# Patient Record
Sex: FEMALE | Race: OTHER | NOT HISPANIC OR LATINO
[De-identification: names, ages, dates, MRNs, and addresses within clinical notes are randomized per-mention and may not be internally consistent; named-entity substitution may affect disease eponyms.]

---

## 2022-06-01 ENCOUNTER — TRANSCRIPTION ENCOUNTER (OUTPATIENT)
Age: 27
End: 2022-06-01

## 2022-06-01 NOTE — ASU PATIENT PROFILE, ADULT - ABILITY TO HEAR (WITH HEARING AID OR HEARING APPLIANCE IF NORMALLY USED):
left ear/Mildly to Moderately Impaired: difficulty hearing in some environments or speaker may need to increase volume or speak distinctly Adequate: hears normal conversation without difficulty

## 2022-06-01 NOTE — ASU PATIENT PROFILE, ADULT - NS PREOP UNDERSTANDS INFO
Patient instructed to be npo after midnight, wear loose comfortable clothes, comfortable shoes, patient instructed to bring covid id card, insurance card, picture id and a credit card for any financial transactions. Patient verbalizes understanding of preop instructions, denies any questions. Time given/yes

## 2022-06-01 NOTE — ASU PATIENT PROFILE, ADULT - NS TRANSFER PATIENT BELONGINGS
earrings, necklace, bracelet; belly piercing/Cell Phone/PDA (specify)/Jewelry/Money (specify)/Clothing

## 2022-06-01 NOTE — ASU PATIENT PROFILE, ADULT - FALL HARM RISK - UNIVERSAL INTERVENTIONS
Bed in lowest position, wheels locked, appropriate side rails in place/Call bell, personal items and telephone in reach/Instruct patient to call for assistance before getting out of bed or chair/Non-slip footwear when patient is out of bed/[place] to call system/Physically safe environment - no spills, clutter or unnecessary equipment/Purposeful Proactive Rounding/Room/bathroom lighting operational, light cord in reach

## 2022-06-01 NOTE — ASU PATIENT PROFILE, ADULT - INTERNATIONAL TRAVEL
-- DO NOT REPLY / DO NOT REPLY ALL --  -- Message is from the [facility]--    Patient is requesting a medication refill - medication is on active medication list    Patient is currently OUT of the requested medication.    Was Medication Pended?  No.     Rx Name and Dose:  Concerna ER 18 mg tablet     Duration: 30 days With refills for 90 day    Pharmacy  [id number] - [facility] [address]    Patient confirmed the above pharmacy as correct?  Yes    Does this request need an existing or new prescription at a pharmacy to be sent to a new pharmacy location?   No    Caller Information       Type Contact Phone    12/30/2021 10:40 AM CST Phone (Incoming) MONICA DIOP (Father) [phone] (M)          Alternative phone number: none    Turnaround time given to caller:   \"This message will be sent to [state Provider's name]. The clinical team will fulfill your request as soon as they review your message.\"   No

## 2022-06-02 ENCOUNTER — OUTPATIENT (OUTPATIENT)
Dept: OUTPATIENT SERVICES | Facility: HOSPITAL | Age: 27
LOS: 1 days | Discharge: HOME | End: 2022-06-02
Payer: COMMERCIAL

## 2022-06-02 ENCOUNTER — TRANSCRIPTION ENCOUNTER (OUTPATIENT)
Age: 27
End: 2022-06-02

## 2022-06-02 VITALS
RESPIRATION RATE: 15 BRPM | SYSTOLIC BLOOD PRESSURE: 125 MMHG | DIASTOLIC BLOOD PRESSURE: 79 MMHG | HEART RATE: 75 BPM | TEMPERATURE: 98 F | OXYGEN SATURATION: 99 %

## 2022-06-02 VITALS
TEMPERATURE: 96 F | DIASTOLIC BLOOD PRESSURE: 68 MMHG | OXYGEN SATURATION: 100 % | RESPIRATION RATE: 16 BRPM | HEIGHT: 63 IN | WEIGHT: 172.62 LBS | SYSTOLIC BLOOD PRESSURE: 128 MMHG | HEART RATE: 75 BPM

## 2022-06-02 PROCEDURE — 88311 DECALCIFY TISSUE: CPT | Mod: 26

## 2022-06-02 PROCEDURE — 88305 TISSUE EXAM BY PATHOLOGIST: CPT | Mod: 26

## 2022-06-02 PROCEDURE — 88300 SURGICAL PATH GROSS: CPT | Mod: 26,59

## 2022-06-02 DEVICE — SURGIFLO HEMOSTATIC MATRIX KIT: Type: IMPLANTABLE DEVICE | Status: FUNCTIONAL

## 2022-06-02 RX ORDER — DROSPIRENONE AND ETHINYL ESTRADIOL 0.03MG-3MG
1 KIT ORAL
Qty: 0 | Refills: 0 | DISCHARGE

## 2022-06-02 RX ORDER — FENTANYL CITRATE 50 UG/ML
25 INJECTION INTRAVENOUS
Refills: 0 | Status: DISCONTINUED | OUTPATIENT
Start: 2022-06-02 | End: 2022-06-02

## 2022-06-02 RX ORDER — OXYCODONE HYDROCHLORIDE 5 MG/1
5 TABLET ORAL ONCE
Refills: 0 | Status: DISCONTINUED | OUTPATIENT
Start: 2022-06-02 | End: 2022-06-02

## 2022-06-02 RX ORDER — SODIUM CHLORIDE 9 MG/ML
1000 INJECTION, SOLUTION INTRAVENOUS
Refills: 0 | Status: DISCONTINUED | OUTPATIENT
Start: 2022-06-02 | End: 2022-06-02

## 2022-06-02 RX ORDER — SODIUM CHLORIDE 9 MG/ML
500 INJECTION, SOLUTION INTRAVENOUS ONCE
Refills: 0 | Status: COMPLETED | OUTPATIENT
Start: 2022-06-02 | End: 2022-06-02

## 2022-06-02 RX ORDER — ACETAMINOPHEN 500 MG
650 TABLET ORAL ONCE
Refills: 0 | Status: DISCONTINUED | OUTPATIENT
Start: 2022-06-02 | End: 2022-06-02

## 2022-06-02 RX ORDER — ONDANSETRON 8 MG/1
4 TABLET, FILM COATED ORAL ONCE
Refills: 0 | Status: DISCONTINUED | OUTPATIENT
Start: 2022-06-02 | End: 2022-06-02

## 2022-06-02 RX ADMIN — OXYCODONE HYDROCHLORIDE 5 MILLIGRAM(S): 5 TABLET ORAL at 11:51

## 2022-06-02 RX ADMIN — FENTANYL CITRATE 25 MICROGRAM(S): 50 INJECTION INTRAVENOUS at 09:37

## 2022-06-02 RX ADMIN — OXYCODONE HYDROCHLORIDE 5 MILLIGRAM(S): 5 TABLET ORAL at 11:21

## 2022-06-02 RX ADMIN — SODIUM CHLORIDE 1500 MILLILITER(S): 9 INJECTION, SOLUTION INTRAVENOUS at 12:22

## 2022-06-02 RX ADMIN — SODIUM CHLORIDE 75 MILLILITER(S): 9 INJECTION, SOLUTION INTRAVENOUS at 10:15

## 2022-06-02 RX ADMIN — FENTANYL CITRATE 25 MICROGRAM(S): 50 INJECTION INTRAVENOUS at 09:52

## 2022-06-02 NOTE — BRIEF OPERATIVE NOTE - NSICDXBRIEFPOSTOP_GEN_ALL_CORE_FT
POST-OP DIAGNOSIS:  Deviated septum 02-Jun-2022 07:03:22  Volpi, Paddy GARDNER  Nasal turbinate hypertrophy 02-Jun-2022 07:03:26  Volpi, Paddy GARDNER  Chronic sinusitis 02-Jun-2022 07:03:30  VolpiPaddy  
POST-OP DIAGNOSIS:  Deviated septum 02-Jun-2022 07:03:22  Volpi, Paddy GARDNER  Nasal turbinate hypertrophy 02-Jun-2022 07:03:26  Volpi, Paddy GARDNER  Chronic sinusitis 02-Jun-2022 07:03:30  VolpiPaddy

## 2022-06-02 NOTE — BRIEF OPERATIVE NOTE - NSICDXBRIEFPREOP_GEN_ALL_CORE_FT
PRE-OP DIAGNOSIS:  Deviated septum 02-Jun-2022 07:03:02  Paddy Kumar  Nasal turbinate hypertrophy 02-Jun-2022 07:03:06  Paddy Kumar  Chronic sinusitis 02-Jun-2022 07:03:11  Paddy Kumar  
PRE-OP DIAGNOSIS:  Deviated septum 02-Jun-2022 07:03:02  Paddy Kumar  Nasal turbinate hypertrophy 02-Jun-2022 07:03:06  Paddy Kumar  Chronic sinusitis 02-Jun-2022 07:03:11  Paddy Kumar

## 2022-06-02 NOTE — ASU PREOP CHECKLIST - SELECT TESTS ORDERED
pre-op urine pregnancy test negative, printed and in chart/CBC/CMP/PT/PTT/INR/Urinalysis/HCG/EKG/COVID-19

## 2022-06-02 NOTE — BRIEF OPERATIVE NOTE - NSICDXBRIEFPROCEDURE_GEN_ALL_CORE_FT
PROCEDURES:  Septoplasty 02-Jun-2022 07:02:43  Paddy Kumar  Turbinate resection 02-Jun-2022 07:02:49  Paddy Kumar  Functional endoscopic sinus surgery in adult 02-Jun-2022 07:02:53  Paddy Kumar  
PROCEDURES:  Septoplasty 02-Jun-2022 07:02:43  Paddy Kumar  Turbinate resection 02-Jun-2022 07:02:49  Paddy Kumar  Functional endoscopic sinus surgery in adult 02-Jun-2022 07:02:53  Paddy Kumar

## 2022-06-10 LAB — SURGICAL PATHOLOGY STUDY: SIGNIFICANT CHANGE UP

## 2023-08-17 PROBLEM — Z78.9 OTHER SPECIFIED HEALTH STATUS: Chronic | Status: ACTIVE | Noted: 2022-06-02

## 2023-08-24 ENCOUNTER — APPOINTMENT (OUTPATIENT)
Dept: OTOLARYNGOLOGY | Facility: CLINIC | Age: 28
End: 2023-08-24
Payer: COMMERCIAL

## 2023-08-24 DIAGNOSIS — Z80.9 FAMILY HISTORY OF MALIGNANT NEOPLASM, UNSPECIFIED: ICD-10-CM

## 2023-08-24 DIAGNOSIS — J34.89 OTHER SPECIFIED DISORDERS OF NOSE AND NASAL SINUSES: ICD-10-CM

## 2023-08-24 PROBLEM — Z00.00 ENCOUNTER FOR PREVENTIVE HEALTH EXAMINATION: Status: ACTIVE | Noted: 2023-08-24

## 2023-08-24 PROCEDURE — 99203 OFFICE O/P NEW LOW 30 MIN: CPT

## 2023-08-24 RX ORDER — SEMAGLUTIDE 1.7 MG/.75ML
INJECTION, SOLUTION SUBCUTANEOUS
Refills: 0 | Status: ACTIVE | COMMUNITY

## 2023-08-24 RX ORDER — DROSPIRENONE AND ETHINYL ESTRADIOL 0.03MG-3MG
KIT ORAL
Refills: 0 | Status: ACTIVE | COMMUNITY

## 2023-08-24 NOTE — ASSESSMENT
[FreeTextEntry1] : Septal perforation and nasal valve pathology. I will refer to Dr. Xin Mccurdy for consideration for functional nasal reconstruction and potential repair of the septal perforation.

## 2023-08-24 NOTE — HISTORY OF PRESENT ILLNESS
[de-identified] : Initial visit. Chief complaint is "got no surgery last year and have continued problems". I reviewed her operative report which indicated she underwent a septoplasty, turbinate reduction, and maxillary and ethmoid sinus surgery in June 2022. She reports that the indication for this was to help with the pressure she was having in her left face radiating to the back of her neck and arm. She reports that she does have a history of migraines but they have been under control and she generally only gets 1 migraine headache a year.  She reports that after the surgery she feels that her nasal airway is not as good as it was prior to the surgery. She also reports nasal whistling. She reports that she only had 1 postoperative visit and at that time was told that she had a hole probably heal.

## 2023-09-25 ENCOUNTER — APPOINTMENT (OUTPATIENT)
Dept: OTOLARYNGOLOGY | Facility: CLINIC | Age: 28
End: 2023-09-25
Payer: COMMERCIAL

## 2023-09-25 VITALS — WEIGHT: 170 LBS | HEIGHT: 63 IN | BODY MASS INDEX: 30.12 KG/M2

## 2023-09-25 PROCEDURE — 99213 OFFICE O/P EST LOW 20 MIN: CPT

## 2023-11-10 NOTE — ASU PATIENT PROFILE, ADULT - BLOOD AVOIDANCE/RESTRICTIONS, PROFILE
Class III - visualization of the soft palate and the base of the uvula Class II - visualization of the soft palate, fauces, and uvula none

## 2024-06-13 NOTE — ASU DISCHARGE PLAN (ADULT/PEDIATRIC) - NS MD DC FALL RISK RISK
For information on Fall & Injury Prevention, visit: [email or web address] OR  [email or web address] OR  https://www.cdc.gov/steadi/patient.html
No

## (undated) DEVICE — GLV 7.5 PROTEXIS (WHITE)

## (undated) DEVICE — SOL ANTI FOG

## (undated) DEVICE — PACK RHINOPLASTY

## (undated) DEVICE — S&N ARTHROCARE ENT WAND REFLEX ULTRA PTR

## (undated) DEVICE — WARMING BLANKET LOWER ADULT

## (undated) DEVICE — SUT PLAIN GUT 4-0 18" SC-1

## (undated) DEVICE — DRSG TELFA 3 X 8

## (undated) DEVICE — SLV COMPRESSION KNEE MED

## (undated) DEVICE — SHAVER BLADE OLYMPUS DIEGO ELITE 4MM

## (undated) DEVICE — SUT CHROMIC 4-0 18" G-3

## (undated) DEVICE — TUBING DIEGO DECLOG